# Patient Record
Sex: FEMALE | Race: OTHER | Employment: OTHER | URBAN - METROPOLITAN AREA
[De-identification: names, ages, dates, MRNs, and addresses within clinical notes are randomized per-mention and may not be internally consistent; named-entity substitution may affect disease eponyms.]

---

## 2018-05-12 ENCOUNTER — APPOINTMENT (OUTPATIENT)
Dept: GENERAL RADIOLOGY | Age: 58
End: 2018-05-12
Attending: PHYSICIAN ASSISTANT
Payer: COMMERCIAL

## 2018-05-12 ENCOUNTER — HOSPITAL ENCOUNTER (EMERGENCY)
Age: 58
Discharge: HOME | End: 2018-05-12
Attending: EMERGENCY MEDICINE
Payer: COMMERCIAL

## 2018-05-12 VITALS
HEIGHT: 66 IN | RESPIRATION RATE: 18 BRPM | OXYGEN SATURATION: 100 % | DIASTOLIC BLOOD PRESSURE: 81 MMHG | TEMPERATURE: 98.4 F | WEIGHT: 185 LBS | SYSTOLIC BLOOD PRESSURE: 118 MMHG | BODY MASS INDEX: 29.73 KG/M2 | HEART RATE: 75 BPM
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
## 2018-05-12 DIAGNOSIS — M54.2 NECK PAIN: Primary | ICD-10-CM

## 2018-05-12 PROCEDURE — 72050 X-RAY EXAM NECK SPINE 4/5VWS: CPT

## 2018-05-12 PROCEDURE — 99283 EMERGENCY DEPT VISIT LOW MDM: CPT

## 2018-05-12 RX ORDER — CYCLOBENZAPRINE HCL 10 MG
10 TABLET ORAL
Qty: 15 TAB | Refills: 0 | Status: SHIPPED | OUTPATIENT
Start: 2018-05-12 | End: 2020-02-14

## 2018-05-12 RX ORDER — NAPROXEN 500 MG/1
500 TABLET ORAL 2 TIMES DAILY WITH MEALS
Qty: 14 TAB | Refills: 0 | Status: SHIPPED | OUTPATIENT
Start: 2018-05-12 | End: 2020-02-14 | Stop reason: ALTCHOICE
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
## 2022-02-02 ENCOUNTER — HOSPITAL ENCOUNTER (OUTPATIENT)
Dept: PHYSICAL THERAPY | Age: 62
Discharge: HOME | End: 2022-02-02
Payer: COMMERCIAL

## 2022-02-02 PROCEDURE — 97162 PT EVAL MOD COMPLEX 30 MIN: CPT | Performed by: PHYSICAL THERAPIST

## 2022-02-02 PROCEDURE — 97110 THERAPEUTIC EXERCISES: CPT | Performed by: PHYSICAL THERAPIST

## 2022-02-02 NOTE — PROGRESS NOTES
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   ===========================================================================================  Assessment / key information:  60 y/o female presents to PT with c/o R anteiror chest and mid-back pain on/off for the past 2 years. She is able to do massage therapy, but just works through any discomfort. Currently, her pain is 4/10. Examination reveals cervical AROM is full in all directions except extension limited to 50% due to pain in neck. Thoracic rotation was painful in area of chief complaint in each direction. Pt has some increased prominence of upper ribcage on right and throughout her mid thoraicc area. Generalized decreased thoracic extension and rib mobility. Ms [place] has signs and symptoms suggestive of costochondritiis due to underlying postural dysfunction.   She was educated on diaphragmatic breathing and basic postural correction exercises.  ===========================================================================================  Eval Complexity: History MEDIUM  Complexity : 1-2 comorbidities / personal factors will impact the outcome/ POC ;  Examination  MEDIUM Complexity : 3 Standardized tests and measures addressing body structure, function, activity limitation and / or participation in recreation ; Presentation MEDIUM Complexity : Evolving with changing characteristics ; Decision Making MEDIUM Complexity : FOTO score of 26-74; Overall Complexity MEDIUM  Problem List: pain affecting function, decrease ROM, decrease strength, decrease ADL/ functional abilitiies, decrease activity tolerance and decrease flexibility/ joint mobility   Treatment Plan may include any combination of the following: Therapeutic exercise, Therapeutic activities, Neuromuscular re-education, Physical agent/modality, Gait/balance training, Manual therapy, Dry Needling, Aquatic therapy, Patient education, Self Care training, Functional mobility training, Home safety training and Stair training  Patient / Family readiness to learn indicated by: asking questions, trying to perform skills and interest  Persons(s) to be included in education: patient (P)  Barriers to Learning/Limitations: None  Measures taken:    Patient Goal (s): \"learn to breathe better and loosen pectoralis muscle\"   Patient self reported health status: fair  Rehabilitation Potential: good   Short Term Goals: To be accomplished in  2  weeks:  1. Pt independent with basic HEP for postural correction and breathing exercises. 2. Pt to manage pain to </= 3/10 with periodic stretching throughout workday.  Long Term Goals: To be accomplished in  6  weeks:  1. Pt to increase FOTO score from 56 to >/= 68.  2. Pt independent with high level HEP for postural correction/strengthening. 3. Pt able to work entire week without pain >/= 1/10.  4. Pt to have full, painfree AROM of neck and thoracic spine to allow painfree ADLs.   Frequency / Duration:   Patient to be seen  2  times per week for 6  weeks:  Patient / Caregiver education and instruction: self care, activity modification and exercises    Therapist Signature: Benito Alarcon, PT Date: 3/6/2960   Certification Period: NA Time: 9:16 AM   ===========================================================================================  I certify that the above Physical Therapy Services are being furnished while the patient is under my care. I agree with the treatment plan and certify that this therapy is necessary. Physician Signature:        Date:       Time:  _     ANNELIESE Tubbs  Please sign and return to [facility] or you may fax the signed copy to [phone]. Thank you.

## 2023-05-20 RX ORDER — CYANOCOBALAMIN 1000 UG/ML
1000 INJECTION, SOLUTION INTRAMUSCULAR; SUBCUTANEOUS
COMMUNITY